# Patient Record
Sex: FEMALE | Race: WHITE | URBAN - METROPOLITAN AREA
[De-identification: names, ages, dates, MRNs, and addresses within clinical notes are randomized per-mention and may not be internally consistent; named-entity substitution may affect disease eponyms.]

---

## 2018-03-06 ENCOUNTER — HISTORICAL (OUTPATIENT)
Dept: ADMINISTRATIVE | Facility: HOSPITAL | Age: 83
End: 2018-03-06

## 2022-04-30 NOTE — OP NOTE
DATE OF SURGERY:   3-6-18    SURGEON:  Nieves Merritt MD    PREOPERATIVE DIAGNOSIS:  Open angle glaucoma of the left eye.    POSTOPERATIVE DIAGNOSIS:  same status post procedure    PROCEDURE:  Ahmed placement in the left eye with scleral patch graft reinforcement of the sclera lefteye.     ANESTHESIA:  MAC plus local.   COMPLICATIONS:  None.   ESTIMATED BLOOD LOSS:  Less than 1 cc.    INDICATIONS:  The patient was evaluated in clinic and noted to have an elevated intraocular pressure on maximum medical therapy.    The advantages, risks and benefits of surgical intervention were discussed with the patient including but not limited to bleeding, infection, decreased vision, loss of the eye and need for subsequent surgery as well as worsening of cataract and diplopia.  The patient acknowledged understanding and wished to proceed.  Informed consent was obtained from the patient in clinic.    PROCEDURE IN DETAIL:  The patient was brought to the operating room and laid supine postion. MAC anesthesia undertaken without complication.  The left eye and periorbital region were prepped and draped in the usual manner for ocular surgery.  A 7-0 Vicryl traction suture was placed in the cornea superiorly and the eye was moved inferiorly for access to the surgical site.  A small peritomy was made at 12 o'clock and injection of preservative free lidocaine/marcaine mixture was injected into sub-Tenon's space for further anesthesia as well as dissection.  The peritomy was then continued for two clock hours using Jackie scissors and undermined.  Wing relaxing incisions were made temporally.  The sub-Tenon space was expanded with blunt tipped Jackie scissors.  Hemostasis was maintained using bipolar cautery.  The Ahmed valve was primed with BSS and then placed into sub-Tenon space  A caliper was used to position the valve precisely 8 mm posterior to the limbus in superotemporal position.  Two 10-0 nylon sutures were used to  secure it to the sclera.  The tube was cut 3 mm long for insertion to the anterior chamber.  23 gauge needle was used to make the incision to the anterior chamber parallel to the iris.  The tube was inserted and noted to be in position in front of the iris and away from the cornea.  The tube was secured to the sclera using a 10-0 nylon suture.  The tube was covered with scleral patch graft which was secured to the sclerae using 10-0 nylon suture .  The conjunctiva was then closed using interrupted and mattress 10-0 nylon sutures.  The IOP was judged to be physiologic.  The tube remains in good position in the anterior chamber.  Postop injections of Ancef and Dexamethasone were injected  subconjunctivally.  The lid speculum was removed.  Maxitrolointment was placed on the operative eye followed by pressure patch and shield.  The patient left the operating room in stable condition having tolerated the procedure well.